# Patient Record
Sex: MALE | Race: ASIAN | NOT HISPANIC OR LATINO | Employment: FULL TIME | URBAN - METROPOLITAN AREA
[De-identification: names, ages, dates, MRNs, and addresses within clinical notes are randomized per-mention and may not be internally consistent; named-entity substitution may affect disease eponyms.]

---

## 2022-04-13 ENCOUNTER — HOSPITAL ENCOUNTER (EMERGENCY)
Facility: HOSPITAL | Age: 27
Discharge: HOME | End: 2022-04-13
Attending: EMERGENCY MEDICINE | Admitting: EMERGENCY MEDICINE
Payer: COMMERCIAL

## 2022-04-13 VITALS
HEART RATE: 86 BPM | RESPIRATION RATE: 16 BRPM | TEMPERATURE: 97.8 F | DIASTOLIC BLOOD PRESSURE: 69 MMHG | BODY MASS INDEX: 19.14 KG/M2 | WEIGHT: 104 LBS | OXYGEN SATURATION: 97 % | SYSTOLIC BLOOD PRESSURE: 127 MMHG | HEIGHT: 62 IN

## 2022-04-13 DIAGNOSIS — M79.662 PAIN OF LEFT LOWER LEG: ICD-10-CM

## 2022-04-13 PROCEDURE — 99282 EMERGENCY DEPT VISIT SF MDM: CPT

## 2022-04-13 ASSESSMENT — ENCOUNTER SYMPTOMS
SHORTNESS OF BREATH: 0
HEMATURIA: 0
SORE THROAT: 0
DIARRHEA: 0
DIZZINESS: 0
CONFUSION: 0
VOMITING: 0
DYSURIA: 0
NAUSEA: 0
JOINT SWELLING: 0
CHILLS: 0
FEVER: 0
ABDOMINAL PAIN: 0

## 2022-04-13 NOTE — ED PROVIDER NOTES
Emergency Department Encounter     Evaluation Date & Time:   4/13/2022  8:11 AM    CHIEF COMPLAINT:  Leg Pain      Triage Note:Patient presents to ED for evaluation of left leg pain that began around 0300. States played soccer in high school, may be reason for his pain. Pain 8/10, ambulated to triage without difficulty. CMS intact.               ED COURSE & MEDICAL DECISION MAKING:        Pt presenting with new onset left lower leg pain that he woke up with this morning.  Pain is mild, worse with certain movements and isolated to a very focal point along lateral posterior portion of knee with no surrounding erythema/edema/rash/effusion.  Pt has FROM of knee.  He denies any specific injury/trauma.  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  No treatment at home.  Nothing to suggest DVT or more nefarious process.  No indication for labs, imaging. Discussed with him ibuprofen, expected course, follow up with a primary clinic (referral placed) and return precautions.  Pt agreeable.      8:16 AM I met with the patient, obtained history, performed an initial exam, and discussed options and plan for diagnostics and treatment here in the ED.  8:38 AM We discussed the plan for discharge and the patient is agreeable. Reviewed supportive cares, symptomatic treatment, outpatient follow up, and reasons to return to the Emergency Department. Patient to be discharged by ED RN.     At the conclusion of the encounter I discussed the results of all the tests and the disposition. The questions were answered. The patient or family acknowledged understanding and was agreeable with the care plan.      MEDICATIONS GIVEN IN THE EMERGENCY DEPARTMENT:  Medications - No data to display    NEW PRESCRIPTIONS STARTED AT TODAY'S ED VISIT:  New Prescriptions    No medications on file       HPI   The history is provided by the patient and a relative. A  was used (sister, in person).         Chago Villegas is a 27 year old male with a pertinent history of ankle sprain who presents to this ED via private car for evaluation of leg pain.     Earlier this morning around 5826-3251 (~4 hours ago), patient woke up with pain in the back of his left knee. The pain does not radiate up or down his leg, and is localized to the lateral side of his knee. He does not note any pain in the past few days, new exercises, or recent injury or trauma. He does not take any regular medications. No blood clot history or recent travel. No other complaints at this time.     REVIEW OF SYSTEMS:  Review of Systems   Constitutional: Negative for chills and fever.   HENT: Negative for sore throat.    Eyes: Negative for visual disturbance.   Respiratory: Negative for shortness of breath.    Cardiovascular: Negative for chest pain.   Gastrointestinal: Negative for abdominal pain, diarrhea, nausea and vomiting.   Endocrine: Negative for polyuria.   Genitourinary: Negative for dysuria and hematuria.        - urinary changes     Musculoskeletal: Negative for joint swelling.        Positive for left leg pain.    Skin: Negative for rash.   Neurological: Negative for dizziness.   Psychiatric/Behavioral: Negative for confusion.   All other systems reviewed and are negative.      Medical History   No past medical history on file.    Past Surgical History:   Procedure Laterality Date     APPENDECTOMY  10/31/2014    [facility]     LAPAROSCOPIC APPENDECTOMY N/A 11/1/2014    Procedure: APPENDECTOMY LAPAROSCOPIC;  Surgeon: Byron Lanza MD;  Location: [facility];  Service:        Family History   Problem Relation Age of Onset     No Known Problems Mother      No Known Problems Father        Social History     Tobacco Use     Smoking status: Never Smoker     Smokeless tobacco: Never Used   Substance Use Topics     Alcohol use: Yes     Comment: Alcoholic Drinks/day: Occasional-Holidays only 1-2 cans     Drug use: No  "      acetaminophen (TYLENOL) 325 MG tablet  GUAIFENESIN (COUGH SYRUP ORAL)        Physical Exam     Vitals:  /69   Pulse 86   Temp 97.8  F (36.6  C) (Temporal)   Resp 16   Ht 1.575 m (5' 2\")   Wt 47.2 kg (104 lb)   SpO2 97%   BMI 19.02 kg/m      PHYSICAL EXAM:   Physical Exam  Vitals and nursing note reviewed.   Constitutional:       General: He is not in acute distress.     Appearance: Normal appearance.   HENT:      Head: Normocephalic and atraumatic.      Nose: Nose normal.      Mouth/Throat:      Mouth: Mucous membranes are moist.   Eyes:      Extraocular Movements: Extraocular movements intact.   Cardiovascular:      Rate and Rhythm: Normal rate and regular rhythm.      Pulses: Normal pulses.           Radial pulses are 2+ on the right side and 2+ on the left side.        Dorsalis pedis pulses are 2+ on the right side and 2+ on the left side.        Posterior tibial pulses are 2+ on the right side and 2+ on the left side.   Pulmonary:      Effort: Pulmonary effort is normal.   Musculoskeletal:      Left knee: No deformity (palpable) or effusion.      Comments: Very minimal tenderness posterior lateral left knee along tendon insertion only.  No rash or warmth to left knee, no cap tenderness, full ROM of knee   Skin:     Findings: No rash.   Neurological:      General: No focal deficit present.      Mental Status: He is alert. Mental status is at baseline.      Comments: Fluent speech   Psychiatric:         Mood and Affect: Mood normal.         Behavior: Behavior normal.         Results     LAB:  All pertinent labs reviewed and interpreted  Labs Ordered and Resulted from Time of ED Arrival to Time of ED Departure - No data to display    RADIOLOGY:  No orders to display            ECG:  None      PROCEDURES:  Procedures:  None      FINAL IMPRESSION:    ICD-10-CM    1. Pain of left lower leg  M79.662 Primary Care Referral       0 minutes of critical care time      IGeronimo, am serving as a " scribe to document services personally performed by Dr. Jarred Perez, based on my observations and the provider's statements to me. I, Jarred Perez, DO attest that Geronimo Stratton is acting in a scribe capacity, has observed my performance of the services and has documented them in accordance with my direction.      Jarred Perez DO  Emergency Medicine  [facility] EMERGENCY DEPARTMENT  4/13/2022  8:12 AM        Jarred Perez MD  04/13/22 0851

## 2022-04-13 NOTE — DISCHARGE INSTRUCTIONS
Take ibuprofen 600mg 3 times a day with food for next 7-10 days, elevate leg when possible.  Follow up with a primary clinic. Return to the ER for leg swelling/redness or new concerns such as chest pain, difficulty breathing.

## 2022-04-13 NOTE — ED TRIAGE NOTES
Patient presents to ED for evaluation of left leg pain that began around 0300. States played soccer in high school, may be reason for his pain. Pain 8/10, ambulated to triage without difficulty. CMS intact.

## 2022-04-13 NOTE — Clinical Note
Chago Villegas was seen and treated in our emergency department on 4/13/2022.  He may return to work on 04/16/2022.       If you have any questions or concerns, please don't hesitate to call.      Jarred Perez MD